# Patient Record
Sex: FEMALE | Race: BLACK OR AFRICAN AMERICAN | URBAN - METROPOLITAN AREA
[De-identification: names, ages, dates, MRNs, and addresses within clinical notes are randomized per-mention and may not be internally consistent; named-entity substitution may affect disease eponyms.]

---

## 2018-03-20 ENCOUNTER — VIRTUAL VISIT (OUTPATIENT)
Dept: FAMILY MEDICINE | Facility: OTHER | Age: 28
End: 2018-03-20

## 2018-03-20 NOTE — PROGRESS NOTES
"Date:   Clinician: Deepa Martinez  Clinician NPI: [id number]  Patient: Fannie Lombardo  Patient : 1990  Patient Address: [address] [place] [id number]  Patient Phone: [phone]  Visit Protocol: URI  Patient Summary:  Fannie is a 27 year old ( : 1990 ) female who initiated a Visit for cold, sinus infection, or influenza. When asked the question \"Please sign me up to receive news, health information and promotions. \", Fannie responded \"Yes\".    Fannie states her symptoms started gradually 3-6 days ago.   Her symptoms consist of malaise, myalgia, a sore throat, a headache, rhinitis, a cough, and chills.   Symptom details     Nasal secretions: The color of her mucus is clear.    Cough: Fannie coughs a few times an hour and her cough is more bothersome at night. Phlegm comes into her throat when she coughs. She does not believe the phlegm causes the cough. The color of the phlegm is clear.     Sore throat: Fannie reports having moderate throat pain (between 4-6 on a 10 point pain scale), does not have exudate on her tonsils, and is able to swallow liquids. The lymph nodes in her neck are not enlarged. She states that rashes have not appeared on the skin since the sore throat started.     Headache: She states the headache is moderate (between 4-6 on a 10 point pain scale).      Fannie denies having wheezing, enlarged lymph nodes, teeth pain, fever, dyspnea, ear pain, nasal congestion, and facial pain or pressure. She also denies taking antibiotic medication for the symptoms, double sickening (worsening symptoms after initial improvement), and having recent facial or sinus surgery in the past 60 days.   Within the past week, Fannie has not been exposed to someone with strep throat. She has not recently been exposed to someone with influenza. Fannie has not been in close contact with any high risk individuals.   Weight: 200 lbs   Fannie does not smoke or use smokeless tobacco.   She " denies pregnancy and denies breastfeeding. She has menstruated in the past month.   MEDICATIONS:  Birth control pill, ibuprofen (Advil, Motrin), guaifenesin (Robitussin, Mucinex), and aspirin/acetaminophen/caffeine (Excedrin)  , ALLERGIES:  NKDA   Clinician Response:  Dear Fannie,  Based on the information provided, you have a viral upper respiratory infection, otherwise known as a cold. Symptoms vary from person to person, but can include sneezing, coughing, a runny nose, sore throat, and headache and range from mild to severe.  Unfortunately, there are no medications that can cure a cold, so treatment is focused on controlling symptoms as much as possible. Most people gradually feel better until symptoms are gone in 1-2 weeks.  Medication information  Because you have a viral infection, antibiotics will not help you get better. Treating a viral infection with antibiotics could actually make you feel worse.  I am prescribing:     Fluticasone propionate (Flonase) 50 mcg nasal spray. Take 1-2 inhalations in each nostril 1 time a day. This medication takes several days to start working, so keep taking it even if it doesn't help right away. There are no refills with this prescription.   Unless you are allergic to the over-the-counter medication(s) below, I recommend using:       Acetaminophen (Tylenol or store brand) oral tablet. Take 1-2 tablets by mouth every 4-6 hours to help with the discomfort.      Ibuprofen (Advil or store brand) 200 mg oral tablet. Take 1-3 tablets (200-600 mg) by mouth every 8 hours to help with the discomfort. Make sure to take the ibuprofen with food. Do not exceed 2400 mg in 24 hours.      Guaifenesin + dextromethorphan (Robitussin DM, Mucinex DM, or store brand).      Saline nasal spray ([place] or store brand). Use 1-2 sprays in each nostril 3 times a day as needed for congestion.     Over-the-counter medications do not require a prescription. Ask the pharmacist if you have any questions.   Self care  The following tips will keep you as comfortable as possible while you recover:     Rest    Drink plenty of water and other liquids    Take a hot shower to loosen congestion    Use throat lozenges    Gargle with warm salt water (1/4 teaspoon of salt per 8 ounce glass of water)    Suck on frozen items such as popsicles or ice cubes    Drink hot tea with lemon and honey    Take a spoonful of honey to reduce your cough     When to seek care  Please be seen in a clinic or urgent care if new symptoms develop, or symptoms become worse.  Call 911 or go to the emergency room if you feel that your throat is closing off, you suddenly develop a rash, you are unable to swallow fluids, you are drooling, or you are having difficulty breathing.   Diagnosis: Viral URI  Diagnosis ICD: J06.9  Prescription: fluticasone propionate (Flonase) 50 mcg nasal spray 16 gm, 30 days supply. Take 1-2 inhalations in each nostril 1 time a day. Refills: 0, Refill as needed: no, Allow substitutions: yes  Pharmacy: [id number] - [phone] - [address] [place] [address]

## 2018-04-24 ENCOUNTER — TRANSFERRED RECORDS (OUTPATIENT)
Dept: HEALTH INFORMATION MANAGEMENT | Facility: CLINIC | Age: 28
End: 2018-04-24

## 2018-04-24 LAB
CHOLEST SERPL-MCNC: 133 MG/DL
HBA1C MFR BLD: 5 % (ref 4.2–5.6)
HDLC SERPL-MCNC: 65 MG/DL
LDLC SERPL CALC-MCNC: 59 MG/DL
NONHDLC SERPL-MCNC: 68 MG/DL
TRIGL SERPL-MCNC: 45 MG/DL

## 2018-04-27 ENCOUNTER — OFFICE VISIT (OUTPATIENT)
Dept: FAMILY MEDICINE | Facility: CLINIC | Age: 28
End: 2018-04-27
Payer: COMMERCIAL

## 2018-04-27 VITALS
HEART RATE: 110 BPM | SYSTOLIC BLOOD PRESSURE: 136 MMHG | WEIGHT: 193.3 LBS | TEMPERATURE: 98 F | HEIGHT: 66 IN | BODY MASS INDEX: 31.07 KG/M2 | DIASTOLIC BLOOD PRESSURE: 80 MMHG

## 2018-04-27 DIAGNOSIS — F31.10 BIPOLAR AFFECTIVE DISORDER, CURRENT EPISODE MANIC, CURRENT EPISODE SEVERITY UNSPECIFIED (H): ICD-10-CM

## 2018-04-27 DIAGNOSIS — T71.162A SUICIDE ATTEMPT BY HANGING, INITIAL ENCOUNTER (H): ICD-10-CM

## 2018-04-27 DIAGNOSIS — B00.9 HSV (HERPES SIMPLEX VIRUS) INFECTION: ICD-10-CM

## 2018-04-27 DIAGNOSIS — N76.0 BV (BACTERIAL VAGINOSIS): ICD-10-CM

## 2018-04-27 DIAGNOSIS — Z09 HOSPITAL DISCHARGE FOLLOW-UP: Primary | ICD-10-CM

## 2018-04-27 DIAGNOSIS — B96.89 BV (BACTERIAL VAGINOSIS): ICD-10-CM

## 2018-04-27 PROCEDURE — 99214 OFFICE O/P EST MOD 30 MIN: CPT | Performed by: NURSE PRACTITIONER

## 2018-04-27 ASSESSMENT — ANXIETY QUESTIONNAIRES
3. WORRYING TOO MUCH ABOUT DIFFERENT THINGS: SEVERAL DAYS
1. FEELING NERVOUS, ANXIOUS, OR ON EDGE: MORE THAN HALF THE DAYS
6. BECOMING EASILY ANNOYED OR IRRITABLE: SEVERAL DAYS
2. NOT BEING ABLE TO STOP OR CONTROL WORRYING: NOT AT ALL
7. FEELING AFRAID AS IF SOMETHING AWFUL MIGHT HAPPEN: SEVERAL DAYS

## 2018-04-27 ASSESSMENT — PATIENT HEALTH QUESTIONNAIRE - PHQ9: 5. POOR APPETITE OR OVEREATING: MORE THAN HALF THE DAYS

## 2018-04-27 NOTE — MR AVS SNAPSHOT
After Visit Summary   4/27/2018    Fannie Lombardo    MRN: [id number]           Patient Information     Date Of Birth          1990        Visit Information        Provider Department      4/27/2018 4:00 PM Suzanne Sanders APRN [facility]        Today's Diagnoses     Bipolar affective disorder, current episode manic, current episode severity unspecified (H)    -  1    BV (bacterial vaginosis)        HSV (herpes simplex virus) infection          Care Instructions      Living with Herpes  To speed healing, take care of open herpes sores. To reduce outbreaks, take care of your health. And to keep from infecting others, learn how to avoid spreading the virus.     To ease symptoms    Start episodic treatment at the first sign of symptoms, such as itching or tingling.    Take ibuprofen or acetaminophen to limit any pain.    Sit in a warm or cool bath or use a moist compress to lessen the itching of sores. For some women, genital outbreaks cause burning during urination. In such cases, urinating in a tub of warm water helps reduce burning.    Wear white cotton underwear and loose clothing during outbreaks. Don t wear nylon underwear or tight clothes. They can prevent sores from healing.       To speed healing    Wash sores with mild soap and water. Pat (don't rub) the sores completely dry.    Always wash your hands after touching a sore.    Don t bandage sores. Air helps them heal.    Avoid using any ointment unless it is prescribed. Applying the wrong jelly or cream may hold in moisture and slow healing.    Don t pick at the sores. This can slow healing, and might cause a sore to become infected.    If you wear contacts, wash your hands well before putting them in.       To reduce outbreaks    Eat a balanced diet. Your health care provider may suggest taking supplements. These help ensure that you get all the nutrients you need.    Get plenty of sleep. This helps your immune system  work its best.    Limit stress and tension. Both can weaken the body s defenses.    Limit exposure to sun, wind, and extreme heat or cold. Wear sunscreen and lip balm to help prevent outbreaks.       To protect others    Tell your current sex partner and any future partners that you have herpes. If you don t know what to say, ask your healthcare provider for help.    Use a latex condom that covers the affected areas each time you have sex. This reduces the risk of passing herpes to your partner.    Avoid kissing when you have an oral sore.    Do not have intercourse when genital sores are present. Also keep in mind, herpes can be passed during oral sex and with anal contact.    Don t share towels, toothbrushes, lip balm, or lipstick when you have a sore.    If you have very frequent outbreaks, taking daily antiviral medicines can help reduce the likelihood of transmission to your partner.    Date Last Reviewed: 1/1/2017 2000-2017 [employer]. [address] [place] [address]. All rights reserved. This information is not intended as a substitute for professional medical care. Always follow your healthcare professional's instructions.        Preventing Vaginal Infection  These steps can help you stay comfortable during treatment of a vaginal infection. They also help prevent vaginal infections in the future.  Keeping a healthy balance  Factors that change the normal balance in the vagina can lead to a vaginal infection. To help keep the balance normal, try these tips:    Change out of wet bathing suits and damp exercise clothes as soon as possible. Yeast thrive in a warm, moist environment.    Avoid wearing tight pants. Choose cotton underwear and pantyhose that have a cotton crotch. Cotton keeps you cooler and drier than synthetics.    Don't douche unless directed by your healthcare provider. Douching can destroy friendly bacteria and change the vagina's normal balance.    Wipe from front to  back after using the toilet. This prevents bacteria from spreading from the anus to the vulva.    Wash the vulva with mild, unscented soap or with plain water.    Wash your diaphragm, spermicide applicators, and sex toys with mild soap and water after use. Dry them thoroughly before putting them away.    Change tampons often (every 2 hours to 4 hours). Leaving a tampon in for too long may disrupt the balance of vaginal bacteria.    Avoid vaginal sprays, scented toilet paper and soaps, and deodorant tampons or pads, which can cause vaginal irritation  Staying healthy overall  Good overall health can help you resist infection. To be healthier:    Help protect yourself from STIs (sexually transmitted infections) by using latex condoms for intercourse. Ask your healthcare provider for more information about safer sex.    Eat a variety of healthy foods.    Exercise regularly.    Get enough rest and sleep.    Maintain a healthy weight. If you need to lose weight, ask your healthcare provider for advice on how to start.  Date Last Reviewed: 9/1/2017 2000-2017 [employer]. [address] [place] [address]. All rights reserved. This information is not intended as a substitute for professional medical care. Always follow your healthcare professional's instructions.        Understanding Bipolar Disorder  Bipolar disorder is a serious disorder of the brain. It may severely disrupt your life. At times, it may cause you and your loved ones great pain. But there is hope. Although there is no cure, treatment can help control your symptoms. Talk with your healthcare provider or a mental health professional. He or she can offer guidance and support.    What causes bipolar disorder?  The exact causes of bipolar disorder aren t known. It is known that the disease runs in families. Genes that affect nerve cells in the brain may be inherited, but as yet these genes have not been found.  Who does it  affect?  Over 5 million adults in this country have bipolar disorder. Most often, it strikes young adults. It can affect children and older adults as well. Bipolar disorder affects both men and women. It can strike people of all races, cultures, and incomes.  Ups and downs  Bipolar disorder used to be called manic-depressive illness. That is because it causes extreme mood swings. At times the person may feel almost too happy. These times are often followed by great despair. In some cases, both extremes may occur at once. More often, moods shift back and forth. These mood swings may occur just once in a while. Or they may happen 4 or more times a year. Without treatment, they will likely recur throughout life.  Manic episodes  During manic episodes of bipolar disorder, you feel like you re on top of the world. Even the worst news can t bring you down. You ll likely feel as if you can do anything. And sometimes you may try. You may take great risks, thinking you can t be hurt. You may also talk too fast, and your thoughts may race. You may go for days without sleeping. And you might be very active and do a lot of things in a short time. Manic episodes often end in a depression.  Depressive episodes  In depressive episodes, you feel intense sadness and depression. You may also feel worthless, tired, and helpless. Even the things you value most don t give you pleasure. At times you may want to die. You may even think about taking your own life.  Date Last Reviewed: 2/1/2017 2000-2017 [employer]. [address] [place] [address], [place] [address]. All rights reserved. This information is not intended as a substitute for professional medical care. Always follow your healthcare professional's instructions.      Make an appointment with Integrated Care clinic to Establish Care there, we will also request psych appointment for you to discuss options for sleep and therapy vs medication management     Warning Signs of  "Suicide and What You Can Do  If you think a person could be suicidal, ask, \"Have you thought about suicide?\" If they say \"yes,\" they may already have a plan for how and when they will attempt it. Find out as much as you can. The more detailed the plan, and the easier it is to carry out, the more danger the person is in right now.    Know the warning signs  The warning signs for suicide include:    Threats or talk of suicide    Sense of hopelessness    Buying a gun or other weapon    Statements such as \"Soon, I won't be a problem\" or \"Nothing matters\"    Giving away items they own, making out a will, or planning their     Suddenly being happy or calm after being depressed  Factors that put a person at a higher risk of attempting suicide include:    A history of suicide in the person's family    Previous suicide attempts    Alcohol and drug use, along with impulsive behaviors    Having a diagnose mood disorder such as depression or bipolar disorder    History of trauma or abuse including bullying    Significant losses such as a divorce, death of a loved one, financial problems, or legal problems    Having access to a lethal weapon (for example firearms in the home)    Chronic physical illnesses, including chronic pain    Exposure to suicidal behavior of others  Get help  Don't try to handle this alone. You can be the most help by getting the person to a trained professional. Suicidal thinking may be a sign of depression, a serious but treatable illness.  In an emergency--call 911  Don't leave the person alone. Anyone who is at imminent risk of suicide needs psychiatric services right away. The person must be continuously monitored, and never left out of sight. Call 911 or a 24-hour suicide crisis hotline. It can be found in the white pages of your phone book under \"Suicide.\" You can also take the person to the nearest hospital emergency room (ER).  Don't keep it a secret and don't wait  Call a mental health " clinic or a licensed mental health professional in your area right away: a psychiatrist, clinical psychologist, psychiatric or licensed clinical , marriage and family counselor, or clergy. Tell them you need help for a person who is thinking about suicide.  Resources    National Suicide Prevention Ftskbies475-939-0023 (594-744-LMOV)www.suicidepreventionlifeline.org    National Suicide Rbkmbfx008-180-2796 (800-SUICIDE)    National [place] of Mental Bzwswv533-584-2891xkc.[facility].nih.gov    National [place] on Mental Jflvzoy495-809-3078xlb.m[place]red.org    Mental Health Eyubhjj649-579-9174myq.[facility].org   Date Last Reviewed: 1/1/2017 2000-2017 [employer]. [address] [place] [address]. All rights reserved. This information is not intended as a substitute for professional medical care. Always follow your healthcare professional's instructions.                Follow-ups after your visit        Additional Services     INTEGRATED PRIMARY CARE REFERRAL       Your provider has referred you to: Integrated Primary Care  [facility] serves adults 18 years and older that have complex medical issues with a mental health overlay that are difficult to manage well in a traditional primary care setting. Patients referred to IPC meet at least one of the following criteria:    - Complex medical issues with a mental health overlay  - Patients who are difficult to manage in the traditional primary care setting due to mental health issues    Referrals to Integrated [facility] ([facility]) assume that the referring provider has discussed with the patient that the initial visit will be an establish care visit and Integrated [facility] will take over all primary care services at that visit. The current primary care provider will need to provide care for the patient until the patient has had their first visit at IPC.      Please complete the following questions to help us  determine if your patient qualifies for our program:    Is your patient dealing with chronic pain? No    Is your patient on chronic narcotics? No    Does your patient have any chemical health issues? Yes.  If yes, have they been in a treatment program  No    Is your patient currently receiving psychiatric care? No    I have discussed this referral with my patient and they are agreeable to transfer their primary care to Integrated [facility]. YES.    Please be aware that coverage of these services is subject to the terms and limitations of your health insurance plan.  Call member services at your health plan with any benefit or coverage questions.      Please bring the following to your appointment:  >>   Any x-rays, CTs or MRIs which have been performed.  Contact the facility where they were done to arrange for  prior to your scheduled appointment.  Any new CT, MRI or other procedures ordered by your specialist must be performed at a [place] facility or coordinated by your clinic's referral office.    >>   List of current medications   >>   This referral request   >>   Any documents/labs given to you for this referral                  Who to contact     If you have questions or need follow up information about today's clinic visit or your schedule please contact [facility] directly at [phone].  Normal or non-critical lab and imaging results will be communicated to you by MyChart, letter or phone within 4 business days after the clinic has received the results. If you do not hear from us within 7 days, please contact the clinic through MyChart or phone. If you have a critical or abnormal lab result, we will notify you by phone as soon as possible.  Submit refill requests through [employer] or call your pharmacy and they will forward the refill request to us. Please allow 3 business days for your refill to be completed.          Additional Information About Your Visit        MyChart  "Information     [employer] lets you send messages to your doctor, view your test results, renew your prescriptions, schedule appointments and more. To sign up, go to [email or web address] . Click on \"Log in\" on the left side of the screen, which will take you to the Welcome page. Then click on \"Sign up Now\" on the right side of the page.     You will be asked to enter the access code listed below, as well as some personal information. Please follow the directions to create your username and password.     Your access code is: [email or web address]  Expires: 2018  5:14 PM     Your access code will  in 90 days. If you need help or a new code, please call your [facility] clinic or [phone].        Care EveryWhere ID     This is your [facility] EveryWhere ID. This could be used by other organizations to access your [facility] medical records  [id number]        Your Vitals Were     Pulse Temperature Height BMI (Body Mass Index)          110 98  F (36.7  C) (Oral) 5' 6.14\" (1.68 m) 31.07 kg/m2         Blood Pressure from Last 3 Encounters:   18 136/80    Weight from Last 3 Encounters:   18 193 lb 4.8 oz (87.7 kg)              We Performed the Following     INTEGRATED PRIMARY CARE REFERRAL        Primary Care Provider Fax #    Physician No Ref-Primary [phone]       No address on file        Equal Access to Services     [facility]: Hadii aad ku hadasho Soomaali, waaxda luqadaha, qaybta kaalmada adeegyada, pan sifuentes . So [facility] [phone].    ATENCIÓN: Si habla español, tiene a perez disposición servicios gratuitos de asistencia lingüística. Llame al [phone].    We comply with applicable federal civil rights laws and [place] laws. We do not discriminate on the basis of race, color, national origin, age, disability, sex, sexual orientation, or gender identity.            Thank you!     Thank you for choosing [facility]  for your care. Our goal is always to " provide you with excellent care. Hearing back from our patients is one way we can continue to improve our services. Please take a few minutes to complete the written survey that you may receive in the mail after your visit with us. Thank you!             Your Updated Medication List - Protect others around you: Learn how to safely use, store and throw away your medicines at www.disposemymeds.org.          This list is accurate as of 4/27/18  5:14 PM.  Always use your most recent med list.                   Brand Name Dispense Instructions for use Diagnosis    METRONIDAZOLE PO      Take 500 mg by mouth 2 times daily

## 2018-04-27 NOTE — PROGRESS NOTES
SUBJECTIVE:   Fannie Lombardo is a 27 year old female who presents to clinic today for the following health issues:    establishcing care today with Primary Care Provider       PHQ-9 SCORE 4/27/2018   Total Score 16    No flowsheet data found.      Hospital Follow-up Visit:    Hospital/Nursing Home/IP Rehab Facility: Facility: [facility] ?   Date of visit: 4/23/2018  Reason for visit: Mental health   Tried to comiit suicide Monday with hanging 72 hour hold   Current Status: Worse  Anxiety but not SI  Lot of things happened to bring this on:  Herpes and BV  Neftaly that broke my heart  [facility]   No current symptoms    Depression since 2009 in High School was diagnosed   Bipolar disorder, MONI   Sleep 1 hr last night felt anxious  Marijuana to help sleep before she went in    No specialists following   Wants to manage on her own without medications  Wants to make safety plan, feels stable enough to me managed outpatient             Problems taking medications regularly:  None       Medication changes since discharge: None       Problems adhering to non-medication therapy:  None    Summary of hospitalization:  Discharge summary unavailable  Diagnostic Tests/Treatments reviewed.  Follow up needed: none  Other Healthcare Providers Involved in Patient s Care:         None  Update since discharge: worsened. See above     Post Discharge Medication Reconciliation: discharge medications reconciled, continue medications without change.  Plan of care communicated with patient     Coding guidelines for this visit:  Type of Medical   Decision Making Face-to-Face Visit       within 7 Days of discharge Face-to-Face Visit        within 14 days of discharge   Moderate Complexity [id number]   High Complexity 28098 54620                Problem list and histories reviewed & adjusted, as indicated.  Additional history: as documented    Patient Active Problem List   Diagnosis     Bipolar affective disorder,  "current episode manic, current episode severity unspecified (H)     No past surgical history on file.    Social History   Substance Use Topics     Smoking status: Never Smoker     Smokeless tobacco: Not on file     Alcohol use No     Family History   Problem Relation Age of Onset     Substance Abuse Maternal Grandfather      Substance Abuse Paternal Grandfather          Current Outpatient Prescriptions   Medication Sig Dispense Refill     METRONIDAZOLE PO Take 500 mg by mouth 2 times daily       Allergies not on file  Recent Labs   Lab Test 04/24/18   A1C  5.0   LDL  59   HDL  65   TRIG  45      BP Readings from Last 3 Encounters:   04/27/18 136/80    Wt Readings from Last 3 Encounters:   04/27/18 193 lb 4.8 oz (87.7 kg)                  Labs reviewed in EPIC    Reviewed and updated as needed this visit by clinical staff  Meds       Reviewed and updated as needed this visit by Provider         ROS:  Constitutional, HEENT, cardiovascular, pulmonary, GI, , musculoskeletal, neuro, skin, endocrine and psych systems are negative, except as otherwise noted.    OBJECTIVE:     /80  Pulse 110  Temp 98  F (36.7  C) (Oral)  Ht 5' 6.14\" (1.68 m)  Wt 193 lb 4.8 oz (87.7 kg)  BMI 31.07 kg/m2  Body mass index is 31.07 kg/(m^2).  GENERAL: healthy, alert and no distress  RESP: lungs clear to auscultation - no rales, rhonchi or wheezes  CV: regular rate and rhythm, normal S1 S2, no S3 or S4, no murmur, click or rub, no peripheral edema and peripheral pulses strong  MS: no gross musculoskeletal defects noted, no edema  SKIN: no suspicious lesions or rashes  NEURO: Normal strength and tone, mentation intact and speech normal  PSYCH: mentation appears normal, affect flat, tearful, judgement and insight intact and appearance well groomed    Diagnostic Test Results:  none     ASSESSMENT/PLAN:         ICD-10-CM    1. Hospital discharge follow-up Z09    2. Suicide attempt by hanging, initial encounter (H) T71.162A    3. " Bipolar affective disorder, current episode manic, current episode severity unspecified (H) F31.9 [facility]   4. BV (bacterial vaginosis) N76.0     B96.89    5. HSV (herpes simplex virus) infection B00.9    Establish care and hospital follow up today, pt very tearful but contracts for safety, reports her heart is broken by recent dating and mali herpes.   No specialists following   Wants to manage on her own without medications  Agree her Bipolar feels stable enough to me managed outpatient, should have Psych appointment to discuss medication options and to eval, she adamantly does not wish to be on medications. Discussed will need close follow up.     Counseled on Herpes, BV info given and all questions answered. She did seem reassured after this discussion, will continue to support and , next visit will check if she is needing any refills.     Make soonest appointment at [facility] within the next week to establish care there, and hopefully with [facility] as well if pt is receptive to this. Pt agrees to call crisis number or ER if any Suicide thoughts.     Patient Instructions       Living with Herpes  To speed healing, take care of open herpes sores. To reduce outbreaks, take care of your health. And to keep from infecting others, learn how to avoid spreading the virus.     To ease symptoms    Start episodic treatment at the first sign of symptoms, such as itching or tingling.    Take ibuprofen or acetaminophen to limit any pain.    Sit in a warm or cool bath or use a moist compress to lessen the itching of sores. For some women, genital outbreaks cause burning during urination. In such cases, urinating in a tub of warm water helps reduce burning.    Wear white cotton underwear and loose clothing during outbreaks. Don t wear nylon underwear or tight clothes. They can prevent sores from healing.       To speed healing    Wash sores with mild soap and water. Pat (don't rub) the sores  completely dry.    Always wash your hands after touching a sore.    Don t bandage sores. Air helps them heal.    Avoid using any ointment unless it is prescribed. Applying the wrong jelly or cream may hold in moisture and slow healing.    Don t pick at the sores. This can slow healing, and might cause a sore to become infected.    If you wear contacts, wash your hands well before putting them in.       To reduce outbreaks    Eat a balanced diet. Your health care provider may suggest taking supplements. These help ensure that you get all the nutrients you need.    Get plenty of sleep. This helps your immune system work its best.    Limit stress and tension. Both can weaken the body s defenses.    Limit exposure to sun, wind, and extreme heat or cold. Wear sunscreen and lip balm to help prevent outbreaks.       To protect others    Tell your current sex partner and any future partners that you have herpes. If you don t know what to say, ask your healthcare provider for help.    Use a latex condom that covers the affected areas each time you have sex. This reduces the risk of passing herpes to your partner.    Avoid kissing when you have an oral sore.    Do not have intercourse when genital sores are present. Also keep in mind, herpes can be passed during oral sex and with anal contact.    Don t share towels, toothbrushes, lip balm, or lipstick when you have a sore.    If you have very frequent outbreaks, taking daily antiviral medicines can help reduce the likelihood of transmission to your partner.    Date Last Reviewed: 1/1/2017 2000-2017 [employer]. [address] [place] [address], [place] [address]. All rights reserved. This information is not intended as a substitute for professional medical care. Always follow your healthcare professional's instructions.        Preventing Vaginal Infection  These steps can help you stay comfortable during treatment of a vaginal infection. They also help prevent vaginal  infections in the future.  Keeping a healthy balance  Factors that change the normal balance in the vagina can lead to a vaginal infection. To help keep the balance normal, try these tips:    Change out of wet bathing suits and damp exercise clothes as soon as possible. Yeast thrive in a warm, moist environment.    Avoid wearing tight pants. Choose cotton underwear and pantyhose that have a cotton crotch. Cotton keeps you cooler and drier than synthetics.    Don't douche unless directed by your healthcare provider. Douching can destroy friendly bacteria and change the vagina's normal balance.    Wipe from front to back after using the toilet. This prevents bacteria from spreading from the anus to the vulva.    Wash the vulva with mild, unscented soap or with plain water.    Wash your diaphragm, spermicide applicators, and sex toys with mild soap and water after use. Dry them thoroughly before putting them away.    Change tampons often (every 2 hours to 4 hours). Leaving a tampon in for too long may disrupt the balance of vaginal bacteria.    Avoid vaginal sprays, scented toilet paper and soaps, and deodorant tampons or pads, which can cause vaginal irritation  Staying healthy overall  Good overall health can help you resist infection. To be healthier:    Help protect yourself from STIs (sexually transmitted infections) by using latex condoms for intercourse. Ask your healthcare provider for more information about safer sex.    Eat a variety of healthy foods.    Exercise regularly.    Get enough rest and sleep.    Maintain a healthy weight. If you need to lose weight, ask your healthcare provider for advice on how to start.  Date Last Reviewed: 9/1/2017 2000-2017 [employer]. [address] [place] [address]. All rights reserved. This information is not intended as a substitute for professional medical care. Always follow your healthcare professional's instructions.        Understanding  Bipolar Disorder  Bipolar disorder is a serious disorder of the brain. It may severely disrupt your life. At times, it may cause you and your loved ones great pain. But there is hope. Although there is no cure, treatment can help control your symptoms. Talk with your healthcare provider or a mental health professional. He or she can offer guidance and support.    What causes bipolar disorder?  The exact causes of bipolar disorder aren t known. It is known that the disease runs in families. Genes that affect nerve cells in the brain may be inherited, but as yet these genes have not been found.  Who does it affect?  Over 5 million adults in this country have bipolar disorder. Most often, it strikes young adults. It can affect children and older adults as well. Bipolar disorder affects both men and women. It can strike people of all races, cultures, and incomes.  Ups and downs  Bipolar disorder used to be called manic-depressive illness. That is because it causes extreme mood swings. At times the person may feel almost too happy. These times are often followed by great despair. In some cases, both extremes may occur at once. More often, moods shift back and forth. These mood swings may occur just once in a while. Or they may happen 4 or more times a year. Without treatment, they will likely recur throughout life.  Manic episodes  During manic episodes of bipolar disorder, you feel like you re on top of the world. Even the worst news can t bring you down. You ll likely feel as if you can do anything. And sometimes you may try. You may take great risks, thinking you can t be hurt. You may also talk too fast, and your thoughts may race. You may go for days without sleeping. And you might be very active and do a lot of things in a short time. Manic episodes often end in a depression.  Depressive episodes  In depressive episodes, you feel intense sadness and depression. You may also feel worthless, tired, and helpless. Even  "the things you value most don t give you pleasure. At times you may want to die. You may even think about taking your own life.  Date Last Reviewed: 2017-2017 [employer]. [address] [place] [address], [place] [address]. All rights reserved. This information is not intended as a substitute for professional medical care. Always follow your healthcare professional's instructions.      Make an appointment with [facility] clinic to Establish Care there, we will also request psych appointment for you to discuss options for sleep and therapy vs medication management     Warning Signs of Suicide and What You Can Do  If you think a person could be suicidal, ask, \"Have you thought about suicide?\" If they say \"yes,\" they may already have a plan for how and when they will attempt it. Find out as much as you can. The more detailed the plan, and the easier it is to carry out, the more danger the person is in right now.    Know the warning signs  The warning signs for suicide include:    Threats or talk of suicide    Sense of hopelessness    Buying a gun or other weapon    Statements such as \"Soon, I won't be a problem\" or \"Nothing matters\"    Giving away items they own, making out a will, or planning their     Suddenly being happy or calm after being depressed  Factors that put a person at a higher risk of attempting suicide include:    A history of suicide in the person's family    Previous suicide attempts    Alcohol and drug use, along with impulsive behaviors    Having a diagnose mood disorder such as depression or bipolar disorder    History of trauma or abuse including bullying    Significant losses such as a divorce, death of a loved one, financial problems, or legal problems    Having access to a lethal weapon (for example firearms in the home)    Chronic physical illnesses, including chronic pain    Exposure to suicidal behavior of others  Get help  Don't try to handle this alone. You can " "be the most help by getting the person to a trained professional. Suicidal thinking may be a sign of depression, a serious but treatable illness.  In an emergency--call 911  Don't leave the person alone. Anyone who is at imminent risk of suicide needs psychiatric services right away. The person must be continuously monitored, and never left out of sight. Call 911 or a 24-hour suicide crisis hotline. It can be found in the white pages of your phone book under \"Suicide.\" You can also take the person to the nearest hospital emergency room (ER).  Don't keep it a secret and don't wait  Call a mental health clinic or a licensed mental health professional in your area right away: a psychiatrist, clinical psychologist, psychiatric or licensed clinical , marriage and family counselor, or clergy. Tell them you need help for a person who is thinking about suicide.  Resources    National Suicide Prevention Nrmaumhm747-512-4068 (517-320-UZAI)www.suicidepreventionlifeline.org    National Suicide Fguaiwj438-278-9509 (800-SUICIDE)    National [place] of Mental Zwnvxx959-116-8787ikx.[facility] Center[facility].nih.gov    National [place] on Mental Ilyzvvg875-547-3804gtl.mildred.org    Mental Health Iefvaaj265-129-7407oca.nmha.org   Date Last Reviewed: 1/1/2017 2000-2017 [employer]. [address] [place] [address], [place] [address]. All rights reserved. This information is not intended as a substitute for professional medical care. Always follow your healthcare professional's instructions.            [facility]    "

## 2018-04-27 NOTE — PATIENT INSTRUCTIONS
Living with Herpes  To speed healing, take care of open herpes sores. To reduce outbreaks, take care of your health. And to keep from infecting others, learn how to avoid spreading the virus.     To ease symptoms    Start episodic treatment at the first sign of symptoms, such as itching or tingling.    Take ibuprofen or acetaminophen to limit any pain.    Sit in a warm or cool bath or use a moist compress to lessen the itching of sores. For some women, genital outbreaks cause burning during urination. In such cases, urinating in a tub of warm water helps reduce burning.    Wear white cotton underwear and loose clothing during outbreaks. Don t wear nylon underwear or tight clothes. They can prevent sores from healing.       To speed healing    Wash sores with mild soap and water. Pat (don't rub) the sores completely dry.    Always wash your hands after touching a sore.    Don t bandage sores. Air helps them heal.    Avoid using any ointment unless it is prescribed. Applying the wrong jelly or cream may hold in moisture and slow healing.    Don t pick at the sores. This can slow healing, and might cause a sore to become infected.    If you wear contacts, wash your hands well before putting them in.       To reduce outbreaks    Eat a balanced diet. Your health care provider may suggest taking supplements. These help ensure that you get all the nutrients you need.    Get plenty of sleep. This helps your immune system work its best.    Limit stress and tension. Both can weaken the body s defenses.    Limit exposure to sun, wind, and extreme heat or cold. Wear sunscreen and lip balm to help prevent outbreaks.       To protect others    Tell your current sex partner and any future partners that you have herpes. If you don t know what to say, ask your healthcare provider for help.    Use a latex condom that covers the affected areas each time you have sex. This reduces the risk of passing herpes to your partner.    Avoid  kissing when you have an oral sore.    Do not have intercourse when genital sores are present. Also keep in mind, herpes can be passed during oral sex and with anal contact.    Don t share towels, toothbrushes, lip balm, or lipstick when you have a sore.    If you have very frequent outbreaks, taking daily antiviral medicines can help reduce the likelihood of transmission to your partner.    Date Last Reviewed: 1/1/2017 2000-2017 [employer]. [address] [place] [address]. All rights reserved. This information is not intended as a substitute for professional medical care. Always follow your healthcare professional's instructions.        Preventing Vaginal Infection  These steps can help you stay comfortable during treatment of a vaginal infection. They also help prevent vaginal infections in the future.  Keeping a healthy balance  Factors that change the normal balance in the vagina can lead to a vaginal infection. To help keep the balance normal, try these tips:    Change out of wet bathing suits and damp exercise clothes as soon as possible. Yeast thrive in a warm, moist environment.    Avoid wearing tight pants. Choose cotton underwear and pantyhose that have a cotton crotch. Cotton keeps you cooler and drier than synthetics.    Don't douche unless directed by your healthcare provider. Douching can destroy friendly bacteria and change the vagina's normal balance.    Wipe from front to back after using the toilet. This prevents bacteria from spreading from the anus to the vulva.    Wash the vulva with mild, unscented soap or with plain water.    Wash your diaphragm, spermicide applicators, and sex toys with mild soap and water after use. Dry them thoroughly before putting them away.    Change tampons often (every 2 hours to 4 hours). Leaving a tampon in for too long may disrupt the balance of vaginal bacteria.    Avoid vaginal sprays, scented toilet paper and soaps, and deodorant  tampons or pads, which can cause vaginal irritation  Staying healthy overall  Good overall health can help you resist infection. To be healthier:    Help protect yourself from STIs (sexually transmitted infections) by using latex condoms for intercourse. Ask your healthcare provider for more information about safer sex.    Eat a variety of healthy foods.    Exercise regularly.    Get enough rest and sleep.    Maintain a healthy weight. If you need to lose weight, ask your healthcare provider for advice on how to start.  Date Last Reviewed: 9/1/2017 2000-2017 [employer]. [address] [place] [address]. All rights reserved. This information is not intended as a substitute for professional medical care. Always follow your healthcare professional's instructions.        Understanding Bipolar Disorder  Bipolar disorder is a serious disorder of the brain. It may severely disrupt your life. At times, it may cause you and your loved ones great pain. But there is hope. Although there is no cure, treatment can help control your symptoms. Talk with your healthcare provider or a mental health professional. He or she can offer guidance and support.    What causes bipolar disorder?  The exact causes of bipolar disorder aren t known. It is known that the disease runs in families. Genes that affect nerve cells in the brain may be inherited, but as yet these genes have not been found.  Who does it affect?  Over 5 million adults in this country have bipolar disorder. Most often, it strikes young adults. It can affect children and older adults as well. Bipolar disorder affects both men and women. It can strike people of all races, cultures, and incomes.  Ups and downs  Bipolar disorder used to be called manic-depressive illness. That is because it causes extreme mood swings. At times the person may feel almost too happy. These times are often followed by great despair. In some cases, both extremes may occur  "at once. More often, moods shift back and forth. These mood swings may occur just once in a while. Or they may happen 4 or more times a year. Without treatment, they will likely recur throughout life.  Manic episodes  During manic episodes of bipolar disorder, you feel like you re on top of the world. Even the worst news can t bring you down. You ll likely feel as if you can do anything. And sometimes you may try. You may take great risks, thinking you can t be hurt. You may also talk too fast, and your thoughts may race. You may go for days without sleeping. And you might be very active and do a lot of things in a short time. Manic episodes often end in a depression.  Depressive episodes  In depressive episodes, you feel intense sadness and depression. You may also feel worthless, tired, and helpless. Even the things you value most don t give you pleasure. At times you may want to die. You may even think about taking your own life.  Date Last Reviewed: 2/1/2017 2000-2017 [employer] [facility]. [address] [place] [address]. All rights reserved. This information is not intended as a substitute for professional medical care. Always follow your healthcare professional's instructions.      Make an appointment with Integrated Care clinic to Establish Care there, we will also request psych appointment for you to discuss options for sleep and therapy vs medication management     Warning Signs of Suicide and What You Can Do  If you think a person could be suicidal, ask, \"Have you thought about suicide?\" If they say \"yes,\" they may already have a plan for how and when they will attempt it. Find out as much as you can. The more detailed the plan, and the easier it is to carry out, the more danger the person is in right now.    Know the warning signs  The warning signs for suicide include:    Threats or talk of suicide    Sense of hopelessness    Buying a gun or other weapon    Statements such as \"Soon, I " "won't be a problem\" or \"Nothing matters\"    Giving away items they own, making out a will, or planning their     Suddenly being happy or calm after being depressed  Factors that put a person at a higher risk of attempting suicide include:    A history of suicide in the person's family    Previous suicide attempts    Alcohol and drug use, along with impulsive behaviors    Having a diagnose mood disorder such as depression or bipolar disorder    History of trauma or abuse including bullying    Significant losses such as a divorce, death of a loved one, financial problems, or legal problems    Having access to a lethal weapon (for example firearms in the home)    Chronic physical illnesses, including chronic pain    Exposure to suicidal behavior of others  Get help  Don't try to handle this alone. You can be the most help by getting the person to a trained professional. Suicidal thinking may be a sign of depression, a serious but treatable illness.  In an emergency call 911  Don't leave the person alone. Anyone who is at imminent risk of suicide needs psychiatric services right away. The person must be continuously monitored, and never left out of sight. Call 911 or a 24-hour suicide crisis hotline. It can be found in the white pages of your phone book under \"Suicide.\" You can also take the person to the nearest hospital emergency room (ER).  Don't keep it a secret and don't wait  Call a mental health clinic or a licensed mental health professional in your area right away: a psychiatrist, clinical psychologist, psychiatric or licensed clinical , marriage and family counselor, or clergy. Tell them you need help for a person who is thinking about suicide.  Resources    National Suicide Prevention Zetswlfk143-314-6189 (019-079-UHMM)www.suicidepreventionlifeline.org    National Suicide Nfjswmu640-824-3265 (800-SUICIDE)    National [place] of Mental Fzeece451-764-9001dlp.[facility].nih.gov    National " [place] on Mental Gtfsfru928-047-2467fkc.mildred.org    Mental Health Xnmduem987-105-6417wzg.[facility].org   Date Last Reviewed: 1/1/2017 2000-2017 [employer] [address] [place] [address], [place] [address]. All rights reserved. This information is not intended as a substitute for professional medical care. Always follow your healthcare professional's instructions.

## 2018-04-27 NOTE — Clinical Note
Please schedule pt an appointment with me and C soonest avail please. She can see me until we can get her into see Psych

## 2018-04-28 ASSESSMENT — PATIENT HEALTH QUESTIONNAIRE - PHQ9: SUM OF ALL RESPONSES TO PHQ QUESTIONS 1-9: 16

## 2018-05-02 ENCOUNTER — TELEPHONE (OUTPATIENT)
Dept: FAMILY MEDICINE | Facility: CLINIC | Age: 28
End: 2018-05-02

## 2018-05-02 NOTE — TELEPHONE ENCOUNTER
Patient was referred to the Integrated primary care clinic, chart was reviewed, unclear as to medical complexity, significant MH but unclear if collaborative care psychiatry would be a good fit for the patient, no addiction issues, discussed the case with the PCP and will schedule next visit with current PCP and have a BHC present, will refer to Psych for assessment and then review chart again before accepting into the IPC program.      Mahi Koch P  MEDICAL LEAD

## 2018-05-16 NOTE — TELEPHONE ENCOUNTER
Writer called pt to schedule an appt today with Dr. Mckinnon, pt said she couldn't because she's working. Pt has appt next Wednesday 5/23/18 @ 3:30 pm.      Jayme Luna

## 2018-05-16 NOTE — TELEPHONE ENCOUNTER
Robre Champagne,    Pt called today requesting an appt with IPC. I'm not sure if I'm suppose to schedule with an IPC provider and a BHC. Please clarify.     Thanks,  Jayme.

## 2018-05-16 NOTE — TELEPHONE ENCOUNTER
At this time we are just trying to get her a bridging appt with one of our Psychiatrists to get an accurate dx and to see if she would be a good fit for collaborative psych care.  Suzanne Sanders will continue to be her PCP with [facility] support.  I would see if we can get her into see Ino CARTER, he does have openings today.    Mahi Koch P  MEDICAL LEAD

## 2018-05-20 PROBLEM — B96.89 BV (BACTERIAL VAGINOSIS): Status: ACTIVE | Noted: 2018-05-20

## 2018-05-20 PROBLEM — B00.9 HSV (HERPES SIMPLEX VIRUS) INFECTION: Status: ACTIVE | Noted: 2018-05-20

## 2018-05-20 PROBLEM — N76.0 BV (BACTERIAL VAGINOSIS): Status: ACTIVE | Noted: 2018-05-20

## 2018-05-20 PROBLEM — T71.162A SUICIDE ATTEMPT BY HANGING, INITIAL ENCOUNTER (H): Status: ACTIVE | Noted: 2018-05-20

## 2018-06-20 ENCOUNTER — OFFICE VISIT (OUTPATIENT)
Dept: PSYCHIATRY | Facility: CLINIC | Age: 28
End: 2018-06-20
Payer: COMMERCIAL

## 2018-06-20 DIAGNOSIS — F33.2 SEVERE EPISODE OF RECURRENT MAJOR DEPRESSIVE DISORDER, WITHOUT PSYCHOTIC FEATURES (H): Primary | ICD-10-CM

## 2018-06-20 DIAGNOSIS — F41.9 ANXIETY: ICD-10-CM

## 2018-06-20 PROCEDURE — 90792 PSYCH DIAG EVAL W/MED SRVCS: CPT | Performed by: PSYCHIATRY & NEUROLOGY

## 2018-06-20 RX ORDER — SERTRALINE HYDROCHLORIDE 100 MG/1
TABLET, FILM COATED ORAL
Qty: 30 TABLET | Refills: 2 | Status: SHIPPED | OUTPATIENT
Start: 2018-06-20

## 2018-06-20 RX ORDER — HYDROXYZINE HYDROCHLORIDE 25 MG/1
25 TABLET, FILM COATED ORAL 3 TIMES DAILY PRN
Qty: 90 TABLET | Refills: 1 | Status: SHIPPED | OUTPATIENT
Start: 2018-06-20

## 2018-06-20 NOTE — MR AVS SNAPSHOT
After Visit Summary   6/20/2018    Fannie Lombardo    MRN: [id number]           Patient Information     Date Of Birth          1990        Visit Information        Provider Department      6/20/2018 3:30 PM Umberto Mckinnon MD [facility] Integrated Primary Care        Today's Diagnoses     Severe episode of recurrent major depressive disorder, without psychotic features (H)    -  1    Anxiety          Care Instructions    1. Start Zoloft 50 mg daily for 1 week, THEN increase to 100 mg daily  2. Start hydroxyzine 25 mg three times a day as needed for anxiety/sleep  3. Please reach out to one of the therapy referrals below to schedule an intake.    Therapists in [place] and Surrounding Areas    Cally Garcia, PhD, LP                                                   *Adolescents, Adults, Couples & Family  Specializes in PTSD  [place] [address]  [phone]    No Harrington, PhD,  LP                                          *Individual, 18+ only  Specializes with comorbid medical conditions  [address]  [facility] [id number] [phone]    Johny Bahena, PhD                                                   *Individual, 18+ only  [address], [address]  [place], [place] [address]  [phone]    DARRYL MoscosoN, MA, LP  [address] [facility]  [place], [place] [address] [phone]    Jake Butt, PhD  [address]  [address] [facility]  [place], [place] [id number] [phone]    Karl Anne, PhD, LMFT  Individual, Couples & Family  [address] [place], [place] [id number] [phone]    ADRIANA Hathaway, LICSW  Specializes in trauma therapy  [address] [facility], [address]  [place], [place] [address] [phone]    PRINCE Clements  Specializes in EMDR and trauma focused CBT  [facility]  [address]  [place], [place] [address] [phone]    Geena Loza MA, LMFT  Specializes in PTSD  [address]  [place] [address]  [phone]    Loreto Nayak MA, LP   Adolescents, Adults, Couples & Family      OR     Private Practice  [facility]                                   [address] [place] [address], [address]                                          [place] [address]  [address]                                                 [phone]                              [place] [address]  [phone]    Steffany Murray, LMFT, LICSW                             Individual, Couples, & Family                        OR    Private Practice  [facility]                                                 [address] [facility] [address]  [address]                                             [place] [address]  [facility] [address]                                                             [phone]  [phone]    Trudy Bernabe MS, LMFT  Individuals, Couples, & Family  [address]  [place], [place] [address] [phone]  [email or web address]    Therapy Clinics in/near [facility] ([facility])   [phone]    [facility] ([place])   [usually they are full and closed to new pts]  [phone]    [facility] ([facility])   [phone]    [facility]  [phone]     [employer] [facility])    [phone]     [facility] (women only) ([facility])   [phone]    [facility] (Multiple Locations)   [phone]    [facility]*  ([place])   [phone]    [facility] ([place])   [phone]    [facility] ([facility])   [phone]    [place] [facility] ([facility]): [phone]     [[place] Shepherd, [facility], [place], [facility], [place], ]    [facility] in [facility] [phone]    [facility]*  ([place])   [phone]     [facility]                                                                                                       [facility] ([facility] [place])   [phone]    [facility] ([place])   [phone]    [facility]  [phone]    [facility] [place], [place] [phone]    [facility] ([facility])   [phone]    [facility])* ([facility])   [phone]    [facility] in [place]    *Offers sliding fee schedule  ~Accepts Medicare as a sole payer source    Autism eval   Autism Society website. One place that offers them is at [facility], and their information is [address], [place] [address]     [phone]                                                            Follow-ups after your visit        Who to contact     If you have questions or need follow up information about today's clinic visit or your schedule please contact [facility] directly at [phone].  Normal or non-critical lab and imaging results will be communicated to you by MyChart, letter or phone within 4 business days after the clinic has received the results. If you do not hear from us within 7 days, please contact the clinic through [employer] or phone. If you have a critical or abnormal lab result, we will notify you by phone as soon as possible.  Submit refill requests through [employer] or call your pharmacy and they will forward the refill request to us. Please allow 3 business days for your refill to be completed.          Additional Information About Your Visit        MyChart Information     [employer] lets you send messages to your doctor, view your test results, renew your prescriptions, schedule appointments and more. To sign up, go to  [email or web address] . Click on \"Log in\" on the left side of the screen, which will take you to the Welcome page. Then click on \"Sign up Now\" on the right side of the page.     You will be asked to enter the access code listed below, as well as some personal information. Please follow the directions to create your username and password.     Your access code is: [email or web address]  Expires: 2018  5:14 PM     Your access code will  in 90 days. If you need help or a new code, please call your [facility] clinic or [phone].        Care EveryWhere ID     This is your Care EveryWhere ID. This could be used by other organizations to access your [facility] medical records  [id number]         Blood Pressure from Last 3 Encounters:   18 136/80    Weight from Last 3 Encounters:   18 87.7 kg (193 lb 4.8 oz)              Today, you had the following     No orders found for display         Today's Medication Changes          These changes are accurate as of 18  4:15 PM.  If you have any questions, ask your nurse or doctor.               Start taking these medicines.        Dose/Directions    hydrOXYzine 25 MG tablet   Commonly known as:  ATARAX   Used for:  Anxiety        Dose:  25 mg   Take 1 tablet (25 mg) by mouth 3 times daily as needed for anxiety   Quantity:  90 tablet   Refills:  1       sertraline 100 MG tablet   Commonly known as:  ZOLOFT   Used for:  Severe episode of recurrent major depressive disorder, without psychotic features (H)        Take 50 mg (1/2 tab) daily for 1 week, THEN increase to 100 mg daily   Quantity:  30 tablet   Refills:  2            Where to get your medicines      These medications were sent to [facility] [address] [place] [address] & Market  [address] [place] [address]     Phone:  [phone]     hydrOXYzine 25 MG tablet    sertraline 100 MG tablet                Primary Care Provider Office Phone # Fax #    Suzanne " Treva Sanders, APRN [phone]       [address] [facility] [address]  [facility] [address]        Equal Access to Services     HEIDY WEST : Hadaydee ludy chacon orlando Santillan, waandresda luqami, qasaeta kaalmada marcia, pan johnston yuniorno dunn maria siegel. So [facility] [phone].    ATENCIÓN: Si habla español, tiene a perez disposición servicios gratuitos de asistencia lingüística. Llame al [phone].    We comply with applicable federal civil rights laws and [place] laws. We do not discriminate on the basis of race, color, national origin, age, disability, sex, sexual orientation, or gender identity.            Thank you!     Thank you for choosing [facility]  for your care. Our goal is always to provide you with excellent care. Hearing back from our patients is one way we can continue to improve our services. Please take a few minutes to complete the written survey that you may receive in the mail after your visit with us. Thank you!             Your Updated Medication List - Protect others around you: Learn how to safely use, store and throw away your medicines at www.disposemymeds.org.          This list is accurate as of 6/20/18  4:15 PM.  Always use your most recent med list.                   Brand Name Dispense Instructions for use Diagnosis    hydrOXYzine 25 MG tablet    ATARAX    90 tablet    Take 1 tablet (25 mg) by mouth 3 times daily as needed for anxiety    Anxiety       METRONIDAZOLE PO      Take 500 mg by mouth 2 times daily        sertraline 100 MG tablet    ZOLOFT    30 tablet    Take 50 mg (1/2 tab) daily for 1 week, THEN increase to 100 mg daily    Severe episode of recurrent major depressive disorder, without psychotic features (H)

## 2018-06-20 NOTE — PROGRESS NOTES
"PSYCHIATRIC  DIAGNOSTIC  EVALUATION  INTEGRATED PRIMARY CARE       60 minute evaluation    IDENTIFICATION   Fannie Lombardo is a 27 year old female who was referred by PCP for evaluation of depression and anxiety.  History was provided by patient who was a good historian.      CHIEF COMPLAINT     \" Depression and anxiety \"    HISTORY OF PRESENT ILLNESS     Reports worsening depression and anxiety since ~2017.  States she was feeling overwhelmed at school and was having some relationship problems with her boyfriend.   November is also the anniversary of her mother's death (who  in ).  Pt reports mood and anxiety continued to worsen and she also began to have some fleeting thoughts of suicide.  Symptoms continued to worsen and then in 2018 she learned her boyfriend had been cheating on her and gave her herpes.  Pt felt very distressed and attempted suicide by hanging.  She then apparently called the suicide crisis line and was then hospitalized at [facility] on the inpatient psychiatry unit.  Pt states she declined to start any medications at that time and instead wanted to focus on learning \"skills\" and behavioral strategies to combat anxiety/depression.  Since discharge from the hospital, she has continued to feel very depressed.  Endorses anhedonia, frequent crying spells, poor concentration, psychomotor agitation, and sleep disturbances.  States she either sleeps too much or too little.  She also reports suicidal ideation has worsened and is now \"constant.\"  She states she \"might\" have a plan of how she would act on those thoughts, but declines to share that plan with this writer.  However, she does state she feels safe at this time and has not intent to act on those thoughts.  Also states she would be willing to contact our clinic, PCP, crisis line/911, or go to the ED if thoughts worsened.    She also reports feeling constantly anxious and physically agitated.  She often feels a \"lump in my throat\" and " "palpitations.  Her mind often races and is thinking about multiple different worries.  She is concerned she may possibly have some infection or health condition she doesn't know about and seeks medical workup.  No panic attacks.  She is currently going to school at [employer] and is working, but is finding it harder to function due to her depression and anxiety.    No currently taking any psychotropic medications and is not seeing a therapist.  She did take Zoloft for a couple years in high school, but then stopped to try and manage symptoms \"on my own.\"  She did find Zoloft helpful when she took it.  She did some brief therapy as well.  Reports some past episodes of mood feeling \"euphoric\" which can last for a couple weeks, and possibly some decreased need for sleep, but largely denies any other past/current symptoms of hypomania/sebas.  No history of psychosis.      Substance use:   - Drinks 1-10 (mixture of beer/ vodka) per week  - No tobacco  - No illicit drug use.    PSYCH ROS: DEPRESSION:  reports-suicidal ideation with possible plan, without intent, depressed mood, anhedonia, poor concentration /memory, excessive crying, overwhelmed and sleep disturbances;  DENIES- low energy  SEBAS/HYPOMANIA:  reports-none;  DENIES- increased energy, decreased sleep need, increased activity and grandiosity  PSYCHOSIS:  reports-none;  DENIES- delusions, auditory hallucinations and visual hallucinations  DYSREGULATION:  reports-none;  DENIES- SIB  ANXIETY:  excessive worry and nervous/overwhelmed  TRAUMA RELATED:  recurring nightmares of her mother's death    MEDICAL ROS:  Reports none.  Denies weight gain, sedation, dizziness, cough, arm/ neck pain and falls.     PSYCHIATRIC HISTORY   SIB [method, most recent]- some SIB in high school  Suicidal Ideation Hx [passive, active]- hx of SI since 2017  Suicide Attempt [#, recent, method, regret, tx]:  #- 1x in 4/2018     Most Recent- 4/2018    Violence/Aggression Hx- " "none  Psychosis Hx- none  Psych Hosp [ #, most recent, committed]- 1x at [facility] 4/2018  ECT [#, most recent]- none    PAST MEDICATION TRIALS   1. Zoloft (took for 2 years , did find it helpful)    SUBSTANCE USE                                                                                    Past Use- MJ  Treatment [#, most recent] - none  Medical Consequences [withdrawal, sz etc] - none  HIV/Hepatitis- none  Legal Consequences- none    SOCIAL HISTORY                                                                      patient reported   Financial Support- Works as an .  Living Situation/Family/Relationships- Lives in an apartment  Children- none  Trauma History (self-report)- Traumatic when mother passed away.  Pt found mother.  Legal- none  Early History/Education-  Born and raised [place]. Graduated high school.  Tried going to college at 4 different times, but had to drop out each time due to depression.  Has a twin sister.  Also a step-sister and step-father in [place].    FAMILY HISTORY                                                                       patient reported   Family Mental Health History-  1.  Maternal grandmother: depression       2.  Cousin with depression.     Both grandfather's struggle with alcoholism.  No suicides in the family.    MEDICAL / SURGICAL HISTORY                                 MEDICAL TEAM:     PCP- Dr. Cruz         Therapist- None    PREGNANT or BREASTFEEDING:  NO    Neurologic Hx:   head injury- none     seizure- none      LOC- none     ALLERGY   Review of patient's allergies indicates not on file.    MENTAL STATUS EXAM                                                             Alertness: alert and oriented  Appearance: adequately groomed  Behavior/Demeanor: cooperative and calm, with fair eye contact  Speech: regular rate and rhythm.  Soft volume.  Language: intact  Psychomotor: normal or unremarkable  Mood: \"depressed\"  Affect: restricted; at " times tearful, was congruent to mood; was congruent to content  Thought Process/Associations: unremarkable  Thought Content:  Reports: suicidal ideation +/- plan, but denies any intent to act on that plan.  Denies: violent ideation and psychotic thought   Perception:  Denies hallucinations  Insight: good  Judgment: good  Cognition:  does appear grossly intact; formal cognitive testing was not done  Gait and Station: unremarkable     LABS/IMAGING                                                                                                                  N/A    PSYCHIATRIC DIAGNOSES                                                                                                 1. Major Depressive Disorder, recurrent, severe, without psychotic features  2. Generalized Anxiety Disorder    ASSESSMENT                                                    Fannie Lombardo is a 27 year old female who was referred by PCP for evaluation of depression and anxiety.      DISCUSSION: Pt reports worsening symptoms of depression and anxiety since ~11/2017 in the context of numerous stressors.  Pt also began experiencing fleeting thoughts of suicide around that time, which also worsened and resulted in a suicide attempt in 4/2018 after learning she had contracted herpes from an ex-partner.  She was hospitalized at [facility] in inpatient psychiatry but declined to start medications at that time.  She reports continued worsening of symptoms since discharge.  She acknowledges constant suicidal ideation with possible plan, but denies any intent to act on those thoughts.  She reports she feels safe to be out of the hospital at this time and is willing to reach out for help (i.e. Call our clinic/PCP, crisis line/911) or go to ED if needed.  Discussed voluntary admission to the hospital today, but she declined, and pt not deemed holdable.  Did recommend starting a trial of Zoloft to target depression and anxiety (given she tolerated Zoloft well in  the past and found beneficial) and hydroxyzine prn anxiety/insomnia.  Also recommended starting individual therapy.  She agreed to the plan and was agreeable for close follow-up in our clinic in 4 weeks.  Pt instructed to call us with any problems or concerns.    TREATMENT RISK STATEMENT:  The risks, benefits, alternatives and potential adverse effects have been explained and are understood by the pt. The pt agrees to the treatment plan with the ability to do so. The pt knows to call the clinic for any problems or to access emergency care if needed.  Medical and CD concerns are documented above.  Psychotropic drug interaction check was done, including changes made today, and is discussed above.      PLAN                                                                                                       1) MEDICATION:       - Start Zoloft 50 mg daily x 7 days, THEN increase to 100 mg daily   - Start hydroxyzine 25 mg TID prn anxiety/sleep    2) THERAPY:  Start.  Pt given [facility].     3) RTC: 4 weeks with Dr. Mckinnon    RESIDENT:  Umberto Mckinnon MD    Patient was staffed in clinic with Dr. Mckinnon who will sign the note.    I, Dr. Mckinnon, had an opportunity to interview this patient with the resident and was present for key portions of the exam. I agree with the assessment and plan outlined above.     On my interview the patient was dressed in casual clothing and appeared stated age. Patient was pleasant and cooperative, mood depressed, affect restricted, speech was coherent and goal oriented. Associations tight. Thought process was logical and linear. Content of thought without psychosis but chronic suicidal ideation.Patient alert and oriented x 3.  Recent and remote memory, concentration, fund of knowledge, and use of language were within normal limits. Insight and judgement intact. Gait and station within normal limits.     Dr. JEREL Mckinnon

## 2018-06-20 NOTE — PATIENT INSTRUCTIONS
1. Start Zoloft 50 mg daily for 1 week, THEN increase to 100 mg daily  2. Start hydroxyzine 25 mg three times a day as needed for anxiety/sleep  3. Please reach out to one of the therapy referrals below to schedule an intake.    Therapists in [place] and Surrounding Areas    Cally Garcia, PhD, LP                                                   *Adolescents, Adults, Couples & Family  Specializes in PTSD  [place] near [address]  [phone]    No Harrington, PhD,  LP                                          *Individual, 18+ only  Specializes with comorbid medical conditions  [address]  [facility] [address]  [phone]    Johny Bahena, PhD                                                   *Individual, 18+ only  [address], [address]  [place], [place] [address]  [phone]    DARRYL MoscosoN, MA, LP  [address]  [place], [place] [address] [phone]    Jake Butt, PhD  [address]  [employer] [facility]  [place] [address]  [phone]    Karl Anne, PhD, LMFT  Individual, Couples & Family  [address] [place] [address]  [phone]    ADRIANA Hathaway, LICSW  Specializes in trauma therapy  [address], [address]  [place], [place] [address] [phone]    PRINCE Clements  Specializes in EMDR and trauma focused CBT  [facility]  [address]  [place] [address]  [phone]    Geena Loza MA, LMFT  Specializes in PTSD  [address]  [place], [place] [address] [phone]    Loreto Nayak MA, LP   Adolescents, Adults, Couples & Family      OR     Private Practice  [facility]                                   [address] [place] [address], [facility]                                          [place] [address]  [address]                                                 [phone]                              [place], [place]  [address] [phone]    Steffany Murray, LMFT, LICSW                             Individual, Couples, & Family                        OR    Private Practice  [facility]                                                 [address]  [address]                                             [place] [address]  [facility] [address]                                                             [phone]  [phone]    Trudy Bernabe, MS, LMFT  Individuals, Couples, & Family  [address]  [place] [address] [phone]  [email or web address]    Therapy Clinics in/near [facility] ([facility])   [phone]    [facility] ([place])   [usually they are full and closed to new pts]  [phone]    [facility] ([facility])   [phone]    [facility]  [phone]     [facility])    [phone]     [facility]women only) ([facility])   [phone]    [facility] (Multiple Locations)   [phone]    [facility]*  ([place])   [phone]    [facility] ([place])   [phone]    [facility] ([facility])   [phone]    [facility] [facility] ([facility]): [phone]     [[place], [place], [facility], [place], [place], [place], ]    [facility] in [facility] [phone]    [facility] [place]*  ([facility])   [phone]     [facility]                                                                                                       [facility] ([facility])   [phone]    [facility] ([place])   [phone]    [facility]  [phone]    [facility] [place], [place] [phone]    [facility] ([facility])   [phone]    [facility] ([facility])   [phone]    [facility] in [place]    *Offers sliding fee schedule  ~Accepts Medicare as a sole payer source    Autism eval   Autism Society website. One place that offers them is at [facility], and their information is [address], [place] [address]     [phone]